# Patient Record
Sex: MALE | Race: WHITE | URBAN - METROPOLITAN AREA
[De-identification: names, ages, dates, MRNs, and addresses within clinical notes are randomized per-mention and may not be internally consistent; named-entity substitution may affect disease eponyms.]

---

## 2017-07-22 ENCOUNTER — OFFICE VISIT (OUTPATIENT)
Dept: URGENT CARE | Facility: URGENT CARE | Age: 22
End: 2017-07-22
Payer: COMMERCIAL

## 2017-07-22 VITALS
WEIGHT: 222.2 LBS | HEART RATE: 72 BPM | DIASTOLIC BLOOD PRESSURE: 52 MMHG | TEMPERATURE: 97 F | SYSTOLIC BLOOD PRESSURE: 126 MMHG | OXYGEN SATURATION: 97 %

## 2017-07-22 DIAGNOSIS — H60.501 ACUTE OTITIS EXTERNA OF RIGHT EAR, UNSPECIFIED TYPE: Primary | ICD-10-CM

## 2017-07-22 PROCEDURE — 99202 OFFICE O/P NEW SF 15 MIN: CPT | Performed by: PHYSICIAN ASSISTANT

## 2017-07-22 RX ORDER — CIPROFLOXACIN AND DEXAMETHASONE 3; 1 MG/ML; MG/ML
4 SUSPENSION/ DROPS AURICULAR (OTIC) 2 TIMES DAILY
Qty: 7.5 ML | Refills: 0 | Status: SHIPPED | OUTPATIENT
Start: 2017-07-22 | End: 2017-07-29

## 2017-07-22 RX ORDER — HYDROCORTISONE AND ACETIC ACID 20.75; 10.375 MG/ML; MG/ML
SOLUTION AURICULAR (OTIC) 2 TIMES DAILY
COMMUNITY

## 2017-07-22 RX ORDER — AMOXICILLIN 875 MG
875 TABLET ORAL 2 TIMES DAILY
COMMUNITY

## 2017-07-22 NOTE — NURSING NOTE
Chief Complaint   Patient presents with     Urgent Care     Ear Problem     Pt states has x 3 days in right ear. Pt states does have some drainage coming from ear.        Initial /52 (BP Location: Right arm, Patient Position: Chair, Cuff Size: Adult Large)  Pulse 72  Temp 97  F (36.1  C) (Tympanic)  Wt 222 lb 3.2 oz (100.8 kg)  SpO2 97% There is no height or weight on file to calculate BMI.  Medication Reconciliation: unable or not appropriate to perform   Josue Wyatt CMA ([facility]) 7/22/2017 10:39 AM

## 2017-07-22 NOTE — PATIENT INSTRUCTIONS
External Ear Infection (Adult)    External otitis (also called  swimmer s ear ) is an infection in the ear canal. It is often caused by bacteria or fungus. It can occur a few days after water gets trapped in the ear canal (from swimming or bathing). It can also occur after cleaning too deeply in the ear canal with a cotton swab or other object. Sometimes, hair care products get into the ear canal and cause this problem.  Symptoms can include pain, fever, itching, redness, drainage, or swelling of the ear canal. Temporary hearing loss may also occur.  Home care    Do not try to clean the ear canal. This can push pus and bacteria deeper into the canal.    Use prescribed ear drops as directed. These help reduce swelling and fight the infection. If an ear wick was placed in the ear canal, apply drops right onto the end of the wick. The wick will draw the medication into the ear canal even if it is swollen closed.    A cotton ball may be loosely placed in the outer ear to absorb any drainage.    You may use acetaminophen or ibuprofen to control pain, unless another medication was prescribed. Note: If you have chronic liver or kidney disease or ever had a stomach ulcer or GI bleeding, talk to your health care provider before taking any of these medications.    Do not allow water to get into your ear when bathing. Also, avoid swimming until the infection has cleared.  Prevention    Keep your ears dry. This helps lower the risk of infection. Dry your ears with a towel or hair dryer after getting wet. Also, use ear plugs when swimming.    Do not stick any objects in the ear to remove wax.    If you feel water trapped in your ear, use ear drops right away. You can get these drops over the counter at most drugstores. They work by removing water from the ear canal.  Follow-up care  Follow up with your health care provider in one week, or as advised.  When to seek medical advice  Call your health care provider right away if  any of these occur:    Ear pain becomes worse or doesn t improve after 3 days of treatment    Redness or swelling of the outer ear occurs or gets worse    Headache    Painful or stiff neck    Drowsiness or confusion    Fever of 100.4 F (38 C) or higher, or as directed by your health care provider    Seizure  Date Last Reviewed: 3/22/2015    5527-8140 [employer]. [address] [place] [address]. All rights reserved. This information is not intended as a substitute for professional medical care. Always follow your healthcare professional's instructions.

## 2017-07-22 NOTE — MR AVS SNAPSHOT
After Visit Summary   7/22/2017    Negro Varela    MRN: [id number]           Patient Information     Date Of Birth          1995        Visit Information        Provider Department      7/22/2017 10:25 AM Orlando Alex PA-C [facility]        Today's Diagnoses     Acute otitis externa of right ear, unspecified type    -  1      Care Instructions      External Ear Infection (Adult)    External otitis (also called  swimmer s ear ) is an infection in the ear canal. It is often caused by bacteria or fungus. It can occur a few days after water gets trapped in the ear canal (from swimming or bathing). It can also occur after cleaning too deeply in the ear canal with a cotton swab or other object. Sometimes, hair care products get into the ear canal and cause this problem.  Symptoms can include pain, fever, itching, redness, drainage, or swelling of the ear canal. Temporary hearing loss may also occur.  Home care    Do not try to clean the ear canal. This can push pus and bacteria deeper into the canal.    Use prescribed ear drops as directed. These help reduce swelling and fight the infection. If an ear wick was placed in the ear canal, apply drops right onto the end of the wick. The wick will draw the medication into the ear canal even if it is swollen closed.    A cotton ball may be loosely placed in the outer ear to absorb any drainage.    You may use acetaminophen or ibuprofen to control pain, unless another medication was prescribed. Note: If you have chronic liver or kidney disease or ever had a stomach ulcer or GI bleeding, talk to your health care provider before taking any of these medications.    Do not allow water to get into your ear when bathing. Also, avoid swimming until the infection has cleared.  Prevention    Keep your ears dry. This helps lower the risk of infection. Dry your ears with a towel or hair dryer after getting wet. Also, use ear plugs  when swimming.    Do not stick any objects in the ear to remove wax.    If you feel water trapped in your ear, use ear drops right away. You can get these drops over the counter at most drugstores. They work by removing water from the ear canal.  Follow-up care  Follow up with your health care provider in one week, or as advised.  When to seek medical advice  Call your health care provider right away if any of these occur:    Ear pain becomes worse or doesn t improve after 3 days of treatment    Redness or swelling of the outer ear occurs or gets worse    Headache    Painful or stiff neck    Drowsiness or confusion    Fever of 100.4 F (38 C) or higher, or as directed by your health care provider    Seizure  Date Last Reviewed: 3/22/2015    3173-4612 [employer]. [address] [place] [address]. All rights reserved. This information is not intended as a substitute for professional medical care. Always follow your healthcare professional's instructions.                Follow-ups after your visit        Who to contact     If you have questions or need follow up information about today's clinic visit or your schedule please contact [facility] directly at [phone].  Normal or non-critical lab and imaging results will be communicated to you by Intuithart, letter or phone within 4 business days after the clinic has received the results. If you do not hear from us within 7 days, please contact the clinic through Intuithart or phone. If you have a critical or abnormal lab result, we will notify you by phone as soon as possible.  Submit refill requests through [employer]ault or call your pharmacy and they will forward the refill request to us. Please allow 3 business days for your refill to be completed.          Additional Information About Your Visit        Intuithart Information     [employer] lets you send messages to your doctor, view your test results, renew your prescriptions, schedule  "appointments and more. To sign up, go to [email or web address] . Click on \"Log in\" on the left side of the screen, which will take you to the Welcome page. Then click on \"Sign up Now\" on the right side of the page.     You will be asked to enter the access code listed below, as well as some personal information. Please follow the directions to create your username and password.     Your access code is: [email or web address]  Expires: 10/20/2017 10:57 AM     Your access code will  in 90 days. If you need help or a new code, please call your [facility] clinic or [phone].        Care EveryWhere ID     This is your Care EveryWhere ID. This could be used by other organizations to access your [facility] medical records  [id number]        Your Vitals Were     Pulse Temperature Pulse Oximetry             72 97  F (36.1  C) (Tympanic) 97%          Blood Pressure from Last 3 Encounters:   17 126/52    Weight from Last 3 Encounters:   17 222 lb 3.2 oz (100.8 kg)              Today, you had the following     No orders found for display         Today's Medication Changes          These changes are accurate as of: 17 10:57 AM.  If you have any questions, ask your nurse or doctor.               Start taking these medicines.        Dose/Directions    ciprofloxacin-dexamethasone otic suspension   Commonly known as:  CIPRODEX   Used for:  Acute otitis externa of right ear, unspecified type   Started by:  Orlando Alex PA-C        Dose:  4 drop   Place 4 drops into the right ear 2 times daily for 7 days   Quantity:  7.5 mL   Refills:  0            Where to get your medicines      These medications were sent to [facility] - [address]  [address], [place] [address]     Phone:  [phone]     ciprofloxacin-dexamethasone otic suspension                Primary Care Provider    Fvl None       No address on file        Equal Access " to Services     LAURA GROSSMAN : Rebecca Nicole, wawalter dueñas, qakathijames barahonaabadtian williamson. So [facility] [phone].    ATENCIÓN: Si habla español, tiene a sheridan disposición servicios gratuitos de asistencia lingüística. Llame al [phone].    We comply with applicable federal civil rights laws and [place] laws. We do not discriminate on the basis of race, color, national origin, age, disability sex, sexual orientation or gender identity.            Thank you!     Thank you for choosing [facility]  for your care. Our goal is always to provide you with excellent care. Hearing back from our patients is one way we can continue to improve our services. Please take a few minutes to complete the written survey that you may receive in the mail after your visit with us. Thank you!             Your Updated Medication List - Protect others around you: Learn how to safely use, store and throw away your medicines at www.disposemymeds.org.          This list is accurate as of: 7/22/17 10:57 AM.  Always use your most recent med list.                   Brand Name Dispense Instructions for use Diagnosis    acetic acid-hydrocortisone otic solution    VOSOL-HC     2 times daily        amoxicillin 875 MG tablet    AMOXIL     Take 875 mg by mouth 2 times daily        ciprofloxacin-dexamethasone otic suspension    CIPRODEX    7.5 mL    Place 4 drops into the right ear 2 times daily for 7 days    Acute otitis externa of right ear, unspecified type

## 2017-07-22 NOTE — PROGRESS NOTES
"SUBJECTIVE:    Negro Varela presents to  today for evaluation of right ear pain. He was reportedly dx with both right sided OM and right sided \"swimmer's ear\" 3 days ago ane was placed on Amox 875 bid and Vosol-HC. Pain is increasing. MA note states drainage from right ear.  I personally questioned patient and he denies any drainage.     ROS:     HEENT: Denies any nasal congestion or ST. NO left ear pain. Denies any facial pain or swelling.   RESP:Denies any cough, wheezing or SOB.   GI: Denies any N/V/D. No abdominal pain. Normal BM's  SKIN: Denies rash  NEURO: Denies any headache. headaches, neck stiffness, mental status changes or lethargy.       No past medical history on file.    Current Outpatient Prescriptions:      amoxicillin (AMOXIL) 875 MG tablet, Take 875 mg by mouth 2 times daily, Disp: , Rfl:      acetic acid-hydrocortisone (VOSOL-HC) otic solution, 2 times daily, Disp: , Rfl:   No Known Allergies        OBJECTIVE:  /52 (BP Location: Right arm, Patient Position: Chair, Cuff Size: Adult Large)  Pulse 72  Temp 97  F (36.1  C) (Tympanic)  Wt 222 lb 3.2 oz (100.8 kg)  SpO2 97%    General appearance: alert and no apparent distress  Skin color is pink and without rash.  HEENT:   Conjunctiva not injected.  Sclera clear.  Left TM is normal: no effusions, no erythema, and normal landmarks.  Right external canal is erythematous. Mildly edematous and has small amount of purulent debris present. Positive mild tenderness on manipulation of tragus. No christina-auricular swelling, redness or pain. No facial swelling, redness or pain. The portion of TM I am able to visualize today (approximately 75%) is normal in color and unremarkable.   Nasal mucosa is normal.  Oropharyngeal exam is normal: no lesions, erythema, adenopathy or exudate.  Neck is supple, FROM with no adenopathy  CARDIAC:NORMAL - regular rate and rhythm without murmur.  RESP: Normal - CTA without rales, rhonchi, or wheezing.  ABDOMEN: Abdomen " "soft, non-tender. BS normal. No masses, organomegaly    ASSESSMENT/PLAN:    (E17.450) Acute otitis externa of right ear, unspecified type  (primary encounter diagnosis)  Comment: Prior dx of ROM and SALAS. OM looks resolved. OE not responding to prior tx.   Plan: ciprofloxacin-dexamethasone (CIPRODEX) otic         suspension    1. Continue Amox/advised to finish previously prescribed course of abx   2. Stop Vosol-HC  3. Start Ciprodex   4. Follow-up with PCP if sxs change, worsen or fail to fully resolve with above tx.  5.  In addition to the above, OE \"red flag\" signs and sxs are reviewed with pt both verbally and by way of printed educational material for home review.  Pt verbalizes understanding of and agrees to the above plan.           "

## 2017-07-24 ENCOUNTER — HOSPITAL ENCOUNTER (EMERGENCY)
Facility: CLINIC | Age: 22
Discharge: HOME | End: 2017-07-24
Attending: EMERGENCY MEDICINE | Admitting: EMERGENCY MEDICINE
Payer: COMMERCIAL

## 2017-07-24 VITALS
RESPIRATION RATE: 18 BRPM | TEMPERATURE: 98.3 F | OXYGEN SATURATION: 99 % | DIASTOLIC BLOOD PRESSURE: 94 MMHG | SYSTOLIC BLOOD PRESSURE: 138 MMHG | HEART RATE: 61 BPM | WEIGHT: 215 LBS

## 2017-07-24 DIAGNOSIS — H60.501 ACUTE OTITIS EXTERNA OF RIGHT EAR, UNSPECIFIED TYPE: ICD-10-CM

## 2017-07-24 PROCEDURE — 69210 REMOVE IMPACTED EAR WAX UNI: CPT | Mod: RT

## 2017-07-24 PROCEDURE — 99283 EMERGENCY DEPT VISIT LOW MDM: CPT

## 2017-07-24 PROCEDURE — 25000132 ZZH RX MED GY IP 250 OP 250 PS 637: Performed by: EMERGENCY MEDICINE

## 2017-07-24 RX ORDER — OXYCODONE AND ACETAMINOPHEN 5; 325 MG/1; MG/1
1 TABLET ORAL ONCE
Status: COMPLETED | OUTPATIENT
Start: 2017-07-24 | End: 2017-07-24

## 2017-07-24 RX ORDER — HYDROCODONE BITARTRATE AND ACETAMINOPHEN 5; 325 MG/1; MG/1
1-2 TABLET ORAL EVERY 4 HOURS PRN
Qty: 8 TABLET | Refills: 0 | Status: SHIPPED | OUTPATIENT
Start: 2017-07-24

## 2017-07-24 RX ADMIN — OXYCODONE HYDROCHLORIDE AND ACETAMINOPHEN 1 TABLET: 5; 325 TABLET ORAL at 02:54

## 2017-07-24 NOTE — ED PROVIDER NOTES
History     Chief Complaint:  Otalgia    HPI   Negro Varela is a 21 year old male who presents to the emergency department today for evaluation of otalgia. The patient was already diagnosed with otitis and is currently on Amoxicillin and ear drops that started about 2 days ago, but continues to complain of right ear pain that originally began about 4 days ago. The patient has also taken ibuprofen for pain control.    Allergies:  No Known Drug Allergies    Medications:    amoxicillin (AMOXIL) 875 MG tablet  acetic acid-hydrocortisone (VOSOL-HC) otic solution  ciprofloxacin-dexamethasone (CIPRODEX) otic suspension    Past Medical History:    History reviewed. No pertinent past medical history.    Past Surgical History:    History reviewed. No pertinent past surgical history.     Family History:    History reviewed. No pertinent family history.      Social History:  The patient was accompanied to the ED by his girlfriend.  Marital Status:  Single [1]     Review of Systems   HENT: Positive for ear pain (right ear).    All other systems reviewed and are negative.    Physical Exam     Patient Vitals for the past 24 hrs:   BP Temp Temp src Pulse Resp SpO2 Weight   07/24/17 0206 (!) 138/94 - - - - - -   07/24/17 0204 - 98.3  F (36.8  C) Temporal 61 18 99 % 97.5 kg (215 lb)     Physical Exam  Vital signs and nursing notes reviewed.     Constitutional: laying on gurney appears mildly uncomfortable  HENT: Oropharynx is clear and moist  Eyes: Conjunctivae are normal bilaterally. Pupils equal  Ears: Left ear appears normal. Right ear canal has edema at inferior aspect with partial occlusion of ear canal. No redness or swelling of external ear or periauricular area.  Neck: normal range of motion  Cardiovascular: Normal rate, regular rhythm, normal heart sounds.   Pulmonary/Chest: Effort normal and breath sounds normal. No respiratory distress.   Musculoskeletal: No joint swelling or edema.   Neurological: Alert and oriented.  No focal weakness  Skin: Skin is warm and dry. No rash noted.   Psych: normal affect    Emergency Department Course     Interventions:  0254 Percocet 1 tablet PO     Emergency Department Course:  Nursing notes and vitals reviewed.  I performed an exam of the patient as documented above.   The patient received the above intervention(s).   I discussed the treatment plan with the patient. They expressed understanding of this plan and consented to discharge. They will be discharged home with instructions for care and follow up. In addition, the patient will return to the emergency department if their symptoms persist, worsen, if new symptoms arise or if there is any concern.  All questions were answered.    Impression & Plan      Medical Decision Making:  Negro Varela is a 21 year old male who presents to the emergency department today for evaluation of worsening right ear canal pain. He had recently been started on Ciprodex yesterday from urgent care and has been on Augmentin. However, his pain is getting worse and he hasn't been able to sleep and has come in for evaluation. I performed some debris removal from the ear canal with an ear curette and then placed an ear wick. He has findings of a moderate otitis externa with approximately 80% of the canal blocked at this time. There is no evidence of TM involvement, there is no periauricular swelling, or ear erythema to suggest expansion of cellulitis. He is on the appropriate antibiotic dosing and the ear wick may help with the penetration and I recommend that he continue those drops. He was given medication for pain and he will follow up with his primary care physician in 48 hours for recheck.     Diagnosis:    ICD-10-CM   1. Acute otitis externa of right ear, unspecified type H60.501     Disposition:   The patient was discharged to home with the below medications.    Discharge Medications:  New Prescriptions    HYDROCODONE-ACETAMINOPHEN (NORCO) 5-325 MG PER TABLET     Take 1-2 tablets by mouth every 4 hours as needed for pain     Scribe Disclosure:  I, Reno Oneill, am serving as a scribe at 2:15 AM on 7/24/2017 to document services personally performed by Jefe Russell MD, based on my observations and the provider's statements to me.  7/24/2017   [facility] EMERGENCY DEPARTMENT       Jefe Russell MD  07/24/17 0700

## 2017-07-24 NOTE — ED AVS SNAPSHOT
[facility]    [address] [place] [address]    Phone:  [phone]    Fax:  [phone]                                       Negro Varela   MRN: [id number]    Department:  [facility]   Date of Visit:  7/24/2017           Patient Information     Date Of Birth          1995        Your diagnoses for this visit were:     Acute otitis externa of right ear, unspecified type        You were seen by Jefe Russell MD.      Follow-up Information     Follow up with None, Ike In 2 days.      Discharge References/Attachments     EXTERNAL EAR INFECTION (ADULT) (ENGLISH)      24 Hour Appointment Hotline       To make an appointment at any [facility] clinic, call [phone] ([phone]). If you don't have a family doctor or clinic, we will help you find one. [facility] clinics are conveniently located to serve the needs of you and your family.             Review of your medicines      START taking        Dose / Directions Last dose taken    HYDROcodone-acetaminophen 5-325 MG per tablet   Commonly known as:  NORCO   Dose:  1-2 tablet   Quantity:  8 tablet        Take 1-2 tablets by mouth every 4 hours as needed for pain   Refills:  0          Our records show that you are taking the medicines listed below. If these are incorrect, please call your family doctor or clinic.        Dose / Directions Last dose taken    acetic acid-hydrocortisone otic solution   Commonly known as:  VOSOL-HC        2 times daily   Refills:  0        amoxicillin 875 MG tablet   Commonly known as:  AMOXIL   Dose:  875 mg        Take 875 mg by mouth 2 times daily   Refills:  0        ciprofloxacin-dexamethasone otic suspension   Commonly known as:  CIPRODEX   Dose:  4 drop   Quantity:  7.5 mL        Place 4 drops into the right ear 2 times daily for 7 days   Refills:  0                Prescriptions were sent or printed at these locations (1  Prescription)                   Other Prescriptions                Printed at Department/Unit printer (1 of 1)         HYDROcodone-acetaminophen (NORCO) 5-325 MG per tablet                Orders Needing Specimen Collection     None      Pending Results     No orders found from 7/22/2017 to 7/25/2017.            Pending Culture Results     No orders found from 7/22/2017 to 7/25/2017.            Pending Results Instructions     If you had any lab results that were not finalized at the time of your Discharge, you can call the ED Lab Result RN at [phone]. You will be contacted by this team for any positive Lab results or changes in treatment. The nurses are available 7 days a week from 10A to 6:30P.  You can leave a message 24 hours per day and they will return your call.        Test Results From Your Hospital Stay               Clinical Quality Measure: Blood Pressure Screening     Your blood pressure was checked while you were in the emergency department today. The last reading we obtained was  BP: (!) 138/94 . Please read the guidelines below about what these numbers mean and what you should do about them.  If your systolic blood pressure (the top number) is less than 120 and your diastolic blood pressure (the bottom number) is less than 80, then your blood pressure is normal. There is nothing more that you need to do about it.  If your systolic blood pressure (the top number) is 120-139 or your diastolic blood pressure (the bottom number) is 80-89, your blood pressure may be higher than it should be. You should have your blood pressure rechecked within a year by a primary care provider.  If your systolic blood pressure (the top number) is 140 or greater or your diastolic blood pressure (the bottom number) is 90 or greater, you may have high blood pressure. High blood pressure is treatable, but if left untreated over time it can put you at risk for heart attack, stroke, or kidney failure. You should have your  "blood pressure rechecked by a primary care provider within the next 4 weeks.  If your provider in the emergency department today gave you specific instructions to follow-up with your doctor or provider even sooner than that, you should follow that instruction and not wait for up to 4 weeks for your follow-up visit.        Thank you for choosing [facility]       Thank you for choosing [facility] for your care. Our goal is always to provide you with excellent care. Hearing back from our patients is one way we can continue to improve our services. Please take a few minutes to complete the written survey that you may receive in the mail after you visit with us. Thank you!        [employer] Information     [employer] lets you send messages to your doctor, view your test results, renew your prescriptions, schedule appointments and more. To sign up, go to [email or web address] . Click on \"Log in\" on the left side of the screen, which will take you to the Welcome page. Then click on \"Sign up Now\" on the right side of the page.     You will be asked to enter the access code listed below, as well as some personal information. Please follow the directions to create your username and password.     Your access code is: [email or web address]  Expires: 10/20/2017 10:57 AM     Your access code will  in 90 days. If you need help or a new code, please call your [facility] clinic or [phone].        Care EveryWhere ID     This is your Care EveryWhere ID. This could be used by other organizations to access your [facility] medical records  [id number]        Equal Access to Services     LAURA GROSSMAN : Hadii aad ku hadasho Soomaali, waaxda luqadaha, qaybta kaalmada adeegyada, tian garza . So [facility] [phone].    ATENCIÓN: Si habla español, tiene a sheridan disposición servicios gratuitos de asistencia lingüística. Llame al [phone].    We comply with applicable federal civil rights laws and [place] laws. We do not " discriminate on the basis of race, color, national origin, age, disability sex, sexual orientation or gender identity.            After Visit Summary       This is your record. Keep this with you and show to your community pharmacist(s) and doctor(s) at your next visit.

## 2017-07-24 NOTE — ED NOTES
Pt c/o right ear pain.  Already diagnosed with otitis and is currently on Amoxicillin and ear drops.

## 2017-07-24 NOTE — ED AVS SNAPSHOT
[facility] Emergency Department    [address]    [facility] [address]    Phone:  [phone]    Fax:  [phone]                                       Negro Varela   MRN: [id number]    Department:  [facility] Emergency Department   Date of Visit:  7/24/2017           After Visit Summary Signature Page     I have received my discharge instructions, and my questions have been answered. I have discussed any challenges I see with this plan with the nurse or doctor.    ..........................................................................................................................................  Patient/Patient Representative Signature      ..........................................................................................................................................  Patient Representative Print Name and Relationship to Patient    ..................................................               ................................................  Date                                            Time    ..........................................................................................................................................  Reviewed by Signature/Title    ...................................................              ..............................................  Date                                                            Time

## 2023-06-09 ENCOUNTER — OFFICE VISIT (OUTPATIENT)
Dept: PRIMARY CARE | Facility: CLINIC | Age: 28
End: 2023-06-09
Payer: COMMERCIAL

## 2023-06-09 DIAGNOSIS — H10.9 CONJUNCTIVITIS OF RIGHT EYE, UNSPECIFIED CONJUNCTIVITIS TYPE: Primary | ICD-10-CM

## 2023-06-09 DIAGNOSIS — H10.31 ACUTE BACTERIAL CONJUNCTIVITIS OF RIGHT EYE: Primary | ICD-10-CM

## 2023-06-09 DIAGNOSIS — J30.9 ALLERGIC RHINITIS, UNSPECIFIED SEASONALITY, UNSPECIFIED TRIGGER: ICD-10-CM

## 2023-06-09 PROBLEM — K58.9 IBS (IRRITABLE BOWEL SYNDROME): Status: ACTIVE | Noted: 2023-06-09

## 2023-06-09 PROCEDURE — 99213 OFFICE O/P EST LOW 20 MIN: CPT | Performed by: FAMILY MEDICINE

## 2023-06-09 PROCEDURE — 1036F TOBACCO NON-USER: CPT | Performed by: FAMILY MEDICINE

## 2023-06-09 RX ORDER — TOBRAMYCIN 3 MG/ML
2 SOLUTION/ DROPS OPHTHALMIC EVERY 4 HOURS
Status: DISCONTINUED | OUTPATIENT
Start: 2023-06-09 | End: 2023-06-09

## 2023-06-09 RX ORDER — AMOXICILLIN AND CLAVULANATE POTASSIUM 875; 125 MG/1; MG/1
875 TABLET, FILM COATED ORAL 2 TIMES DAILY
Qty: 20 TABLET | Refills: 0 | Status: SHIPPED | OUTPATIENT
Start: 2023-06-09 | End: 2023-06-19

## 2023-06-09 RX ORDER — TOBRAMYCIN 3 MG/ML
2 SOLUTION/ DROPS OPHTHALMIC EVERY 4 HOURS
Qty: 3 ML | Refills: 0 | Status: SHIPPED | OUTPATIENT
Start: 2023-06-09 | End: 2023-06-10 | Stop reason: ALTCHOICE

## 2023-06-10 RX ORDER — ERYTHROMYCIN 5 MG/G
OINTMENT OPHTHALMIC 4 TIMES DAILY
Qty: 3.5 G | Refills: 0 | Status: SHIPPED | OUTPATIENT
Start: 2023-06-10 | End: 2023-06-17

## 2023-06-10 ASSESSMENT — ENCOUNTER SYMPTOMS
FATIGUE: 0
PHOTOPHOBIA: 0
ACTIVITY CHANGE: 0
FEVER: 0
CHILLS: 0

## 2023-06-10 NOTE — PATIENT INSTRUCTIONS
Expect improvement in the next 48 hours.  If any fever chills or headache or visual disturbance noted.  Please call day or night.

## 2023-06-10 NOTE — PROGRESS NOTES
Subjective   Patient ID: Gonzalo Devi is a 27 y.o. male who presents for No chief complaint on file..    HPI patient having discomfort swelling in the right upper eyelid.  Has had a little bit of drainage in the eye during the daytime.  This has been tender and he has been feeling not well with it.  Is been no fever no chills no double vision no blurring vision but trouble seeing up in the visual field because of the swelling of the upper lid.    Patient had not a lot of nasal drainage congestion.  No headache no sore throat no difficulty with swallowing.    No troubles with abdominal pain or discomfort.  No troubles with swelling of the legs or feet.    Review of Systems   Constitutional:  Negative for activity change, chills, fatigue and fever.   Eyes:  Positive for visual disturbance. Negative for photophobia.        Swelling of the right upper eyelid.       Objective   There were no vitals taken for this visit.  BSA There is no height or weight on file to calculate BSA.      Physical Exam  Constitutional:       General: He is not in acute distress.     Appearance: He is not toxic-appearing.   HENT:      Head: Normocephalic.      Right Ear: Tympanic membrane normal.   Eyes:      Comments: Swelling of the right upper eyelid.  Redness tenderness noted no herpetic lesions    Slight conjunctival injection noted   Cardiovascular:      Rate and Rhythm: Normal rate and regular rhythm.      Pulses: Normal pulses.      Heart sounds: Normal heart sounds.   Pulmonary:      Effort: Pulmonary effort is normal.   Neurological:      Mental Status: He is alert.       [facility]acy Encounter on 10/12/2022   Component Date Value Ref Range Status    Cholesterol 10/12/2022 189  0 - 199 mg/dL Final    Comment: .      AGE      DESIRABLE   BORDERLINE HIGH   HIGH     0-19 Y     0 - 169       170 - 199     >/= 200    20-24 Y     0 - 189       190 - 224     >/= 225         >24 Y     0 - 199       200 - 239     >/= 240   **All ranges are based  on fasting samples. Specific   therapeutic targets will vary based on patient-specific   cardiac risk.  .   Pediatric guidelines reference:Pediatrics 2011, 128(S5).   Adult guidelines reference: NCEP ATPIII Guidelines,     MARY 2001, 258:2486-97  .   Venipuncture immediately after or during the    administration of Metamizole may lead to falsely   low results. Testing should be performed immediately   prior to Metamizole dosing.      HDL 10/12/2022 46.2  mg/dL Final    Comment: .      AGE      VERY LOW   LOW     NORMAL    HIGH       0-19 Y       < 35   < 40     40-45     ----    20-24 Y       ----   < 40       >45     ----      >24 Y       ----   < 40     40-60      >60  .      Cholesterol/HDL Ratio 10/12/2022 4.1   Final    Comment: REF VALUES  DESIRABLE  < 3.4  HIGH RISK  > 5.0      LDL 10/12/2022 97  0 - 99 mg/dL Final    Comment: .                           NEAR      BORD      AGE      DESIRABLE  OPTIMAL    HIGH     HIGH     VERY HIGH     0-19 Y     0 - 109     ---    110-129   >/= 130     ----    20-24 Y     0 - 119     ---    120-159   >/= 160     ----      >24 Y     0 -  99   100-129  130-159   160-189     >/=190  .      VLDL 10/12/2022 45 (H)  0 - 40 mg/dL Final    Triglycerides 10/12/2022 227 (H)  0 - 149 mg/dL Final    Comment: .      AGE      DESIRABLE   BORDERLINE HIGH   HIGH     VERY HIGH   0 D-90 D    19 - 174         ----         ----        ----  91 D- 9 Y     0 -  74        75 -  99     >/= 100      ----    10-19 Y     0 -  89        90 - 129     >/= 130      ----    20-24 Y     0 - 114       115 - 149     >/= 150      ----         >24 Y     0 - 149       150 - 199    200- 499    >/= 500  .   Venipuncture immediately after or during the    administration of Metamizole may lead to falsely   low results. Testing should be performed immediately   prior to Metamizole dosing.      Non HDL Cholesterol 10/12/2022 143  mg/dL Final    Comment:     AGE      DESIRABLE   BORDERLINE HIGH   HIGH     VERY HIGH      0-19 Y     0 - 119       120 - 144     >/= 145    >/= 160    20-24 Y     0 - 149       150 - 189     >/= 190      ----         >24 Y    30 MG/DL ABOVE LDL CHOLESTEROL GOAL  .      WBC 10/12/2022 7.9  4.4 - 11.3 x10E9/L Final    nRBC 10/12/2022 0.0  0.0 - 0.0 /100 WBC Final    RBC 10/12/2022 5.02  4.50 - 5.90 x10E12/L Final    Hemoglobin 10/12/2022 15.0  13.5 - 17.5 g/dL Final    Hematocrit 10/12/2022 45.4  41.0 - 52.0 % Final    MCV 10/12/2022 90  80 - 100 fL Final    MCHC 10/12/2022 33.0  32.0 - 36.0 g/dL Final    Platelets 10/12/2022 188  150 - 450 x10E9/L Final    RDW 10/12/2022 12.5  11.5 - 14.5 % Final    Neutrophils % 10/12/2022 53.3  40.0 - 80.0 % Final    Immature Granulocytes %, Automated 10/12/2022 0.1  0.0 - 0.9 % Final    Comment:  Immature Granulocyte Count (IG) includes promyelocytes,    myelocytes and metamyelocytes but does not include bands.   Percent differential counts (%) should be interpreted in the   context of the absolute cell counts (cells/L).      Lymphocytes % 10/12/2022 34.1  13.0 - 44.0 % Final    Monocytes % 10/12/2022 8.1  2.0 - 10.0 % Final    Eosinophils % 10/12/2022 3.0  0.0 - 6.0 % Final    Basophils % 10/12/2022 1.4  0.0 - 2.0 % Final    Neutrophils Absolute 10/12/2022 4.19  1.20 - 7.70 x10E9/L Final    Lymphocytes Absolute 10/12/2022 2.68  1.20 - 4.80 x10E9/L Final    Monocytes Absolute 10/12/2022 0.64  0.10 - 1.00 x10E9/L Final    Eosinophils Absolute 10/12/2022 0.24  0.00 - 0.70 x10E9/L Final    Basophils Absolute 10/12/2022 0.11 (H)  0.00 - 0.10 x10E9/L Final    Glucose 10/12/2022 85  74 - 99 mg/dL Final    Sodium 10/12/2022 140  136 - 145 mmol/L Final    Potassium 10/12/2022 4.0  3.5 - 5.3 mmol/L Final    Chloride 10/12/2022 106  98 - 107 mmol/L Final    Bicarbonate 10/12/2022 25  21 - 32 mmol/L Final    Anion Gap 10/12/2022 13  10 - 20 mmol/L Final    Urea Nitrogen 10/12/2022 17  6 - 23 mg/dL Final    Creatinine 10/12/2022 1.20  0.50 - 1.30 mg/dL Final    GFR MALE 10/12/2022  85  >90 mL/min/1.73m2 Final    Comment:  CALCULATIONS OF ESTIMATED GFR ARE PERFORMED   USING THE 2021 CKD-EPI STUDY REFIT EQUATION   WITHOUT THE RACE VARIABLE FOR THE IDMS-TRACEABLE   CREATININE METHODS.    https://jasn.asnjournals.org/content/early/2021/09/22/ASN.2297555417      Calcium 10/12/2022 9.6  8.6 - 10.6 mg/dL Final    Albumin 10/12/2022 4.3  3.4 - 5.0 g/dL Final    Alkaline Phosphatase 10/12/2022 53  33 - 120 U/L Final    Total Protein 10/12/2022 6.8  6.4 - 8.2 g/dL Final    AST 10/12/2022 24  9 - 39 U/L Final    Total Bilirubin 10/12/2022 0.7  0.0 - 1.2 mg/dL Final    ALT (SGPT) 10/12/2022 28  10 - 52 U/L Final    Comment:  Patients treated with Sulfasalazine may generate    falsely decreased results for ALT.      TSH 10/12/2022 1.35  0.44 - 3.98 mIU/L Final    Comment:  TSH testing is performed using different testing    methodology at [facility] than at other    [facility] hospitals. Direct result comparisons should    only be made within the same method.     Legacy Encounter on 08/01/2022   Component Date Value Ref Range Status    WBC 08/01/2022 6.7  4.4 - 11.3 x10E9/L Final    nRBC 08/01/2022 0.0  0.0 - 0.0 /100 WBC Final    RBC 08/01/2022 4.94  4.50 - 5.90 x10E12/L Final    Hemoglobin 08/01/2022 15.0  13.5 - 17.5 g/dL Final    Hematocrit 08/01/2022 44.1  41.0 - 52.0 % Final    MCV 08/01/2022 89  80 - 100 fL Final    MCHC 08/01/2022 34.0  32.0 - 36.0 g/dL Final    Platelets 08/01/2022 171  150 - 450 x10E9/L Final    RDW 08/01/2022 12.2  11.5 - 14.5 % Final    Neutrophils % 08/01/2022 55.2  40.0 - 80.0 % Final    Immature Granulocytes %, Automated 08/01/2022 0.1  0.0 - 0.9 % Final    Comment:  Immature Granulocyte Count (IG) includes promyelocytes,    myelocytes and metamyelocytes but does not include bands.   Percent differential counts (%) should be interpreted in the   context of the absolute cell counts (cells/L).      Lymphocytes % 08/01/2022 33.9  13.0 - 44.0 % Final    Monocytes %  08/01/2022 8.1  2.0 - 10.0 % Final    Eosinophils % 08/01/2022 1.5  0.0 - 6.0 % Final    Basophils % 08/01/2022 1.2  0.0 - 2.0 % Final    Neutrophils Absolute 08/01/2022 3.69  1.20 - 7.70 x10E9/L Final    Lymphocytes Absolute 08/01/2022 2.27  1.20 - 4.80 x10E9/L Final    Monocytes Absolute 08/01/2022 0.54  0.10 - 1.00 x10E9/L Final    Eosinophils Absolute 08/01/2022 0.10  0.00 - 0.70 x10E9/L Final    Basophils Absolute 08/01/2022 0.08  0.00 - 0.10 x10E9/L Final    TSH 08/01/2022 1.84  0.44 - 3.98 mIU/L Final    Comment:  TSH testing is performed using different testing    methodology at [facility] than at other    [facility]. Direct result comparisons should    only be made within the same method.      Glucose 08/01/2022 100 (H)  74 - 99 mg/dL Final    Sodium 08/01/2022 139  136 - 145 mmol/L Final    Potassium 08/01/2022 3.8  3.5 - 5.3 mmol/L Final    Chloride 08/01/2022 103  98 - 107 mmol/L Final    Bicarbonate 08/01/2022 25  21 - 32 mmol/L Final    Anion Gap 08/01/2022 15  10 - 20 mmol/L Final    Urea Nitrogen 08/01/2022 16  6 - 23 mg/dL Final    Creatinine 08/01/2022 1.05  0.50 - 1.30 mg/dL Final    GFR MALE 08/01/2022 >90  >90 mL/min/1.73m2 Final    Comment:  CALCULATIONS OF ESTIMATED GFR ARE PERFORMED   USING THE 2021 CKD-EPI STUDY REFIT EQUATION   WITHOUT THE RACE VARIABLE FOR THE IDMS-TRACEABLE   CREATININE METHODS.    https://jasn.asnjournals.org/content/early/2021/09/22/ASN.6273224561      Calcium 08/01/2022 9.6  8.6 - 10.6 mg/dL Final    Albumin 08/01/2022 4.8  3.4 - 5.0 g/dL Final    Alkaline Phosphatase 08/01/2022 58  33 - 120 U/L Final    Total Protein 08/01/2022 7.2  6.4 - 8.2 g/dL Final    AST 08/01/2022 30  9 - 39 U/L Final    Total Bilirubin 08/01/2022 0.9  0.0 - 1.2 mg/dL Final    ALT (SGPT) 08/01/2022 52  10 - 52 U/L Final    Comment:  Patients treated with Sulfasalazine may generate    falsely decreased results for ALT.       Current Outpatient Medications on File Prior to  Visit   Medication Sig Dispense Refill    amoxicillin-pot clavulanate (Augmentin) 875-125 mg tablet Take 1 tablet (875 mg) by mouth 2 times a day for 10 days. 20 tablet 0    [DISCONTINUED] tobramycin (Tobrex) 0.3 % ophthalmic solution Administer 2 drops into the right eye every 4 hours for 5 days. 3 mL 0    [DISCONTINUED] tobramycin-lotepred 0.3-0.5 % drops,suspension Administer into affected eye(s).       Current Facility-Administered Medications on File Prior to Visit   Medication Dose Route Frequency Provider Last Rate Last Admin    [DISCONTINUED] tobramycin (Tobrex) 0.3 % ophthalmic solution 2 drop  2 drop Both Eyes q4h Gonzalo Devi,          No images are attached to the encounter.            Assessment/Plan   Problem List Items Addressed This Visit          Other    Allergic rhinitis     Other Visit Diagnoses       Acute bacterial conjunctivitis of right eye    -  Primary    Relevant Medications    erythromycin (Romycin) 5 mg/gram (0.5 %) ophthalmic ointment

## 2023-11-16 DIAGNOSIS — Z00.00 HEALTH MAINTENANCE EXAMINATION: ICD-10-CM

## 2023-11-17 ENCOUNTER — LAB (OUTPATIENT)
Dept: LAB | Facility: LAB | Age: 28
End: 2023-11-17
Payer: COMMERCIAL

## 2023-11-17 DIAGNOSIS — Z00.00 HEALTH MAINTENANCE EXAMINATION: ICD-10-CM

## 2023-11-17 PROCEDURE — 36415 COLL VENOUS BLD VENIPUNCTURE: CPT

## 2023-11-17 PROCEDURE — 80061 LIPID PANEL: CPT

## 2023-11-17 PROCEDURE — 80053 COMPREHEN METABOLIC PANEL: CPT

## 2023-11-17 PROCEDURE — 85025 COMPLETE CBC W/AUTO DIFF WBC: CPT

## 2023-11-17 ASSESSMENT — ENCOUNTER SYMPTOMS
ARTHRALGIAS: 0
CONSTIPATION: 0
FACIAL SWELLING: 0
CONFUSION: 0
COLOR CHANGE: 0
ACTIVITY CHANGE: 0
APPETITE CHANGE: 0
CHILLS: 0
COUGH: 0
FEVER: 0
DIARRHEA: 0
PALPITATIONS: 0
WHEEZING: 0
EYE DISCHARGE: 0

## 2023-11-17 NOTE — PROGRESS NOTES
"Subjective   Patient ID: Gonzalo Devi is a 27 y.o. male who presents for Annual Exam.    HPI   Patient presents for physical exam.     Fam Hx  Mom () living,   Dad () living,      Exercise walks  ETOH denies  Caffeine denies  Tobacco denies     Colonoscopy @ 45     Patient denies other complaints.   Review of Systems   Constitutional:  Negative for activity change, appetite change, chills and fever.   HENT:  Negative for congestion, ear pain and facial swelling.    Eyes:  Negative for discharge.   Respiratory:  Negative for cough and wheezing.    Cardiovascular:  Negative for chest pain, palpitations and leg swelling.   Gastrointestinal:  Negative for constipation and diarrhea.   Musculoskeletal:  Negative for arthralgias.   Skin:  Negative for color change and pallor.   Neurological:  Negative for syncope.   Psychiatric/Behavioral:  Negative for confusion.        Objective   /78 (BP Location: Left arm)   Pulse 80   Ht 1.88 m (6' 2\")   Wt 119 kg (261 lb 12.8 oz)   BMI 33.61 kg/m²   BSA Body surface area is 2.49 meters squared.      Physical Exam  Constitutional:       General: He is not in acute distress.     Appearance: Normal appearance. He is not toxic-appearing.   HENT:      Head: Normocephalic.      Right Ear: Tympanic membrane, ear canal and external ear normal.      Left Ear: Tympanic membrane, ear canal and external ear normal.   Eyes:      Conjunctiva/sclera: Conjunctivae normal.      Pupils: Pupils are equal, round, and reactive to light.   Cardiovascular:      Rate and Rhythm: Normal rate and regular rhythm.      Pulses: Normal pulses.      Heart sounds: Normal heart sounds.   Pulmonary:      Effort: No respiratory distress.      Breath sounds: No wheezing, rhonchi or rales.   Abdominal:      General: Bowel sounds are normal. There is no distension.      Palpations: Abdomen is soft.      Tenderness: There is no abdominal tenderness.   Musculoskeletal:         General: No swelling or " tenderness.   Skin:     Findings: No lesion or rash.   Neurological:      General: No focal deficit present.      Mental Status: He is alert and oriented to person, place, and time. Mental status is at baseline.      Gait: Gait normal.   Psychiatric:         Mood and Affect: Mood normal.         Behavior: Behavior normal.         Thought Content: Thought content normal.         Judgment: Judgment normal.       Lab on 11/17/2023   Component Date Value Ref Range Status    Glucose 11/17/2023 78  74 - 99 mg/dL Final    Sodium 11/17/2023 137  136 - 145 mmol/L Final    Potassium 11/17/2023 4.1  3.5 - 5.3 mmol/L Final    Chloride 11/17/2023 100  98 - 107 mmol/L Final    Bicarbonate 11/17/2023 26  21 - 32 mmol/L Final    Anion Gap 11/17/2023 15  10 - 20 mmol/L Final    Urea Nitrogen 11/17/2023 15  6 - 23 mg/dL Final    Creatinine 11/17/2023 0.98  0.50 - 1.30 mg/dL Final    eGFR 11/17/2023 >90  >60 mL/min/1.73m*2 Final    Calculations of estimated GFR are performed using the 2021 CKD-EPI Study Refit equation without the race variable for the IDMS-Traceable creatinine methods.  https://jasn.asnjournals.org/content/early/2021/09/22/ASN.4692635267    Calcium 11/17/2023 9.9  8.6 - 10.6 mg/dL Final    Albumin 11/17/2023 4.7  3.4 - 5.0 g/dL Final    Alkaline Phosphatase 11/17/2023 43  33 - 120 U/L Final    Total Protein 11/17/2023 7.0  6.4 - 8.2 g/dL Final    AST 11/17/2023 27  9 - 39 U/L Final    Bilirubin, Total 11/17/2023 0.7  0.0 - 1.2 mg/dL Final    ALT 11/17/2023 47  10 - 52 U/L Final    Patients treated with Sulfasalazine may generate falsely decreased results for ALT.    Cholesterol 11/17/2023 254 (H)  0 - 199 mg/dL Final          Age      Desirable   Borderline High   High     0-19 Y     0 - 169       170 - 199     >/= 200    20-24 Y     0 - 189       190 - 224     >/= 225         >24 Y     0 - 199       200 - 239     >/= 240   **All ranges are based on fasting samples. Specific   therapeutic targets will vary based on  patient-specific   cardiac risk.    Pediatric guidelines reference:Pediatrics 2011, 128(S5).Adult guidelines reference: NCEP ATPIII Guidelines,MARY 2001, 258:2486-97    Venipuncture immediately after or during the administration of Metamizole may lead to falsely low results. Testing should be performed immediately prior to Metamizole dosing.    HDL-Cholesterol 11/17/2023 48.6  mg/dL Final      Age       Very Low   Low     Normal    High    0-19 Y    < 35      < 40     40-45     ----  20-24 Y    ----     < 40      >45      ----        >24 Y      ----     < 40     40-60      >60      Cholesterol/HDL Ratio 11/17/2023 5.2   Final      Ref Values  Desirable  < 3.4  High Risk  > 5.0    LDL Calculated 11/17/2023 172 (H)  <=99 mg/dL Final                                Near   Borderline      AGE      Desirable  Optimal    High     High     Very High     0-19 Y     0 - 109     ---    110-129   >/= 130     ----    20-24 Y     0 - 119     ---    120-159   >/= 160     ----      >24 Y     0 -  99   100-129  130-159   160-189     >/=190      VLDL 11/17/2023 33  0 - 40 mg/dL Final    Triglycerides 11/17/2023 165 (H)  0 - 149 mg/dL Final       Age         Desirable   Borderline High   High     Very High   0 D-90 D    19 - 174         ----         ----        ----  91 D- 9 Y     0 -  74        75 -  99     >/= 100      ----    10-19 Y     0 -  89        90 - 129     >/= 130      ----    20-24 Y     0 - 114       115 - 149     >/= 150      ----         >24 Y     0 - 149       150 - 199    200- 499    >/= 500    Venipuncture immediately after or during the administration of Metamizole may lead to falsely low results. Testing should be performed immediately prior to Metamizole dosing.    Non HDL Cholesterol 11/17/2023 205 (H)  0 - 149 mg/dL Final          Age       Desirable   Borderline High   High     Very High     0-19 Y     0 - 119       120 - 144     >/= 145    >/= 160    20-24 Y     0 - 149       150 - 189     >/= 190      ----          >24 Y    30 mg/dL above LDL Cholesterol goal      WBC 11/17/2023 8.4  4.4 - 11.3 x10*3/uL Final    nRBC 11/17/2023 0.0  0.0 - 0.0 /100 WBCs Final    RBC 11/17/2023 5.34  4.50 - 5.90 x10*6/uL Final    Hemoglobin 11/17/2023 15.6  13.5 - 17.5 g/dL Final    Hematocrit 11/17/2023 47.3  41.0 - 52.0 % Final    MCV 11/17/2023 89  80 - 100 fL Final    MCH 11/17/2023 29.2  26.0 - 34.0 pg Final    MCHC 11/17/2023 33.0  32.0 - 36.0 g/dL Final    RDW 11/17/2023 12.0  11.5 - 14.5 % Final    Platelets 11/17/2023 213  150 - 450 x10*3/uL Final    Neutrophils % 11/17/2023 54.7  40.0 - 80.0 % Final    Immature Granulocytes %, Automated 11/17/2023 0.4  0.0 - 0.9 % Final    Immature Granulocyte Count (IG) includes promyelocytes, myelocytes and metamyelocytes but does not include bands. Percent differential counts (%) should be interpreted in the context of the absolute cell counts (cells/UL).    Lymphocytes % 11/17/2023 33.6  13.0 - 44.0 % Final    Monocytes % 11/17/2023 7.3  2.0 - 10.0 % Final    Eosinophils % 11/17/2023 2.4  0.0 - 6.0 % Final    Basophils % 11/17/2023 1.6  0.0 - 2.0 % Final    Neutrophils Absolute 11/17/2023 4.58  1.20 - 7.70 x10*3/uL Final    Percent differential counts (%) should be interpreted in the context of the absolute cell counts (cells/uL).    Immature Granulocytes Absolute, Au* 11/17/2023 0.03  0.00 - 0.70 x10*3/uL Final    Lymphocytes Absolute 11/17/2023 2.81  1.20 - 4.80 x10*3/uL Final    Monocytes Absolute 11/17/2023 0.61  0.10 - 1.00 x10*3/uL Final    Eosinophils Absolute 11/17/2023 0.20  0.00 - 0.70 x10*3/uL Final    Basophils Absolute 11/17/2023 0.13 (H)  0.00 - 0.10 x10*3/uL Final     No current outpatient medications on file prior to visit.     No current facility-administered medications on file prior to visit.     No images are attached to the encounter.            Assessment/Plan   Diagnoses and all orders for this visit:  Healthcare maintenance  Moderate mixed hyperlipidemia not requiring  statin therapy    1. Patient's blood work discussed at this office visit  2. Patient's LDL goal less than 130 current , start TYPE II diet, consider CT calcium score  3. Patient's triglyceride goal less than 150 current triglycerides 165 continue on type IV diet continue exercise  4. Colonoscopy @ 45  5.  Lengthy discussion with patient about diet and exercise he is increasing how much he is lifting and watching his diet more closely  6. Patient to call if questions or concerns

## 2023-11-18 ENCOUNTER — OFFICE VISIT (OUTPATIENT)
Dept: PRIMARY CARE | Facility: CLINIC | Age: 28
End: 2023-11-18
Payer: COMMERCIAL

## 2023-11-18 VITALS
HEIGHT: 74 IN | HEART RATE: 80 BPM | SYSTOLIC BLOOD PRESSURE: 128 MMHG | DIASTOLIC BLOOD PRESSURE: 78 MMHG | BODY MASS INDEX: 33.6 KG/M2 | WEIGHT: 261.8 LBS

## 2023-11-18 DIAGNOSIS — Z00.00 HEALTHCARE MAINTENANCE: Primary | ICD-10-CM

## 2023-11-18 DIAGNOSIS — E78.2 MODERATE MIXED HYPERLIPIDEMIA NOT REQUIRING STATIN THERAPY: ICD-10-CM

## 2023-11-18 LAB
ALBUMIN SERPL BCP-MCNC: 4.7 G/DL (ref 3.4–5)
ALP SERPL-CCNC: 43 U/L (ref 33–120)
ALT SERPL W P-5'-P-CCNC: 47 U/L (ref 10–52)
ANION GAP SERPL CALC-SCNC: 15 MMOL/L (ref 10–20)
AST SERPL W P-5'-P-CCNC: 27 U/L (ref 9–39)
BASOPHILS # BLD AUTO: 0.13 X10*3/UL (ref 0–0.1)
BASOPHILS NFR BLD AUTO: 1.6 %
BILIRUB SERPL-MCNC: 0.7 MG/DL (ref 0–1.2)
BUN SERPL-MCNC: 15 MG/DL (ref 6–23)
CALCIUM SERPL-MCNC: 9.9 MG/DL (ref 8.6–10.6)
CHLORIDE SERPL-SCNC: 100 MMOL/L (ref 98–107)
CHOLEST SERPL-MCNC: 254 MG/DL (ref 0–199)
CHOLESTEROL/HDL RATIO: 5.2
CO2 SERPL-SCNC: 26 MMOL/L (ref 21–32)
CREAT SERPL-MCNC: 0.98 MG/DL (ref 0.5–1.3)
EOSINOPHIL # BLD AUTO: 0.2 X10*3/UL (ref 0–0.7)
EOSINOPHIL NFR BLD AUTO: 2.4 %
ERYTHROCYTE [DISTWIDTH] IN BLOOD BY AUTOMATED COUNT: 12 % (ref 11.5–14.5)
GFR SERPL CREATININE-BSD FRML MDRD: >90 ML/MIN/1.73M*2
GLUCOSE SERPL-MCNC: 78 MG/DL (ref 74–99)
HCT VFR BLD AUTO: 47.3 % (ref 41–52)
HDLC SERPL-MCNC: 48.6 MG/DL
HGB BLD-MCNC: 15.6 G/DL (ref 13.5–17.5)
IMM GRANULOCYTES # BLD AUTO: 0.03 X10*3/UL (ref 0–0.7)
IMM GRANULOCYTES NFR BLD AUTO: 0.4 % (ref 0–0.9)
LDLC SERPL CALC-MCNC: 172 MG/DL
LYMPHOCYTES # BLD AUTO: 2.81 X10*3/UL (ref 1.2–4.8)
LYMPHOCYTES NFR BLD AUTO: 33.6 %
MCH RBC QN AUTO: 29.2 PG (ref 26–34)
MCHC RBC AUTO-ENTMCNC: 33 G/DL (ref 32–36)
MCV RBC AUTO: 89 FL (ref 80–100)
MONOCYTES # BLD AUTO: 0.61 X10*3/UL (ref 0.1–1)
MONOCYTES NFR BLD AUTO: 7.3 %
NEUTROPHILS # BLD AUTO: 4.58 X10*3/UL (ref 1.2–7.7)
NEUTROPHILS NFR BLD AUTO: 54.7 %
NON HDL CHOLESTEROL: 205 MG/DL (ref 0–149)
NRBC BLD-RTO: 0 /100 WBCS (ref 0–0)
PLATELET # BLD AUTO: 213 X10*3/UL (ref 150–450)
POTASSIUM SERPL-SCNC: 4.1 MMOL/L (ref 3.5–5.3)
PROT SERPL-MCNC: 7 G/DL (ref 6.4–8.2)
RBC # BLD AUTO: 5.34 X10*6/UL (ref 4.5–5.9)
SODIUM SERPL-SCNC: 137 MMOL/L (ref 136–145)
TRIGL SERPL-MCNC: 165 MG/DL (ref 0–149)
VLDL: 33 MG/DL (ref 0–40)
WBC # BLD AUTO: 8.4 X10*3/UL (ref 4.4–11.3)

## 2023-11-18 PROCEDURE — 99395 PREV VISIT EST AGE 18-39: CPT | Performed by: FAMILY MEDICINE

## 2023-11-18 PROCEDURE — 1036F TOBACCO NON-USER: CPT | Performed by: FAMILY MEDICINE

## 2024-03-26 DIAGNOSIS — H60.333 ACUTE SWIMMER'S EAR OF BOTH SIDES: Primary | ICD-10-CM

## 2024-03-26 RX ORDER — OFLOXACIN 3 MG/ML
5 SOLUTION AURICULAR (OTIC) 2 TIMES DAILY
Qty: 5 ML | Refills: 0 | Status: SHIPPED | OUTPATIENT
Start: 2024-03-26 | End: 2024-04-02

## 2024-03-26 RX ORDER — AMOXICILLIN AND CLAVULANATE POTASSIUM 875; 125 MG/1; MG/1
875 TABLET, FILM COATED ORAL 2 TIMES DAILY
Qty: 20 TABLET | Refills: 0 | Status: SHIPPED | OUTPATIENT
Start: 2024-03-26 | End: 2024-04-05

## 2024-08-01 ENCOUNTER — LAB (OUTPATIENT)
Dept: LAB | Facility: LAB | Age: 29
End: 2024-08-01
Payer: COMMERCIAL

## 2024-08-01 ENCOUNTER — OFFICE VISIT (OUTPATIENT)
Dept: PRIMARY CARE | Facility: CLINIC | Age: 29
End: 2024-08-01
Payer: COMMERCIAL

## 2024-08-01 VITALS
TEMPERATURE: 97.8 F | WEIGHT: 263.2 LBS | DIASTOLIC BLOOD PRESSURE: 86 MMHG | SYSTOLIC BLOOD PRESSURE: 140 MMHG | HEART RATE: 95 BPM | BODY MASS INDEX: 33.78 KG/M2 | HEIGHT: 74 IN

## 2024-08-01 DIAGNOSIS — R10.9 LEFT FLANK PAIN: ICD-10-CM

## 2024-08-01 DIAGNOSIS — E86.0 DEHYDRATION: ICD-10-CM

## 2024-08-01 DIAGNOSIS — E86.0 DEHYDRATION: Primary | ICD-10-CM

## 2024-08-01 DIAGNOSIS — M62.838 CERVICAL PARASPINAL MUSCLE SPASM: ICD-10-CM

## 2024-08-01 LAB
POC APPEARANCE, URINE: CLEAR
POC BILIRUBIN, URINE: NEGATIVE
POC BLOOD, URINE: ABNORMAL
POC COLOR, URINE: ABNORMAL
POC GLUCOSE, URINE: NEGATIVE MG/DL
POC KETONES, URINE: NEGATIVE MG/DL
POC LEUKOCYTES, URINE: NEGATIVE
POC NITRITE,URINE: NEGATIVE
POC PH, URINE: 6 PH
POC PROTEIN, URINE: NEGATIVE MG/DL
POC SPECIFIC GRAVITY, URINE: 1.02
POC UROBILINOGEN, URINE: 1 EU/DL

## 2024-08-01 PROCEDURE — 81003 URINALYSIS AUTO W/O SCOPE: CPT | Performed by: STUDENT IN AN ORGANIZED HEALTH CARE EDUCATION/TRAINING PROGRAM

## 2024-08-01 PROCEDURE — 82550 ASSAY OF CK (CPK): CPT

## 2024-08-01 PROCEDURE — 36415 COLL VENOUS BLD VENIPUNCTURE: CPT

## 2024-08-01 PROCEDURE — 3008F BODY MASS INDEX DOCD: CPT | Performed by: STUDENT IN AN ORGANIZED HEALTH CARE EDUCATION/TRAINING PROGRAM

## 2024-08-01 PROCEDURE — 83735 ASSAY OF MAGNESIUM: CPT

## 2024-08-01 PROCEDURE — 85025 COMPLETE CBC W/AUTO DIFF WBC: CPT

## 2024-08-01 PROCEDURE — 99213 OFFICE O/P EST LOW 20 MIN: CPT | Performed by: STUDENT IN AN ORGANIZED HEALTH CARE EDUCATION/TRAINING PROGRAM

## 2024-08-01 PROCEDURE — 80053 COMPREHEN METABOLIC PANEL: CPT

## 2024-08-01 RX ORDER — CYCLOBENZAPRINE HCL 5 MG
5 TABLET ORAL NIGHTLY PRN
Qty: 10 TABLET | Refills: 0 | Status: SHIPPED | OUTPATIENT
Start: 2024-08-01

## 2024-08-01 ASSESSMENT — ENCOUNTER SYMPTOMS
DIARRHEA: 0
SHORTNESS OF BREATH: 0
ARTHRALGIAS: 0
FEVER: 0
BACK PAIN: 0
FATIGUE: 0
HEMATURIA: 0
LIGHT-HEADEDNESS: 0
CHILLS: 1
COUGH: 0
CONSTIPATION: 0
NECK PAIN: 1
FLANK PAIN: 1
VOMITING: 0
HEADACHES: 1
MYALGIAS: 0
NAUSEA: 0
DIZZINESS: 0
ABDOMINAL PAIN: 0

## 2024-08-01 ASSESSMENT — PATIENT HEALTH QUESTIONNAIRE - PHQ9
2. FEELING DOWN, DEPRESSED OR HOPELESS: NOT AT ALL
SUM OF ALL RESPONSES TO PHQ9 QUESTIONS 1 AND 2: 0
1. LITTLE INTEREST OR PLEASURE IN DOING THINGS: NOT AT ALL

## 2024-08-01 NOTE — PATIENT INSTRUCTIONS
We can go right downstairs and get the blood work done.  We will contact you back with results.  These will also be available on [facility] if they are not back before the weekend.    I went and sent in a medication called cyclobenzaprine.  This is a muscle relaxer.  He can take this at night prior to bed.  This can help to relax the muscles and will also make you little bit drowsy.  Please make sure to be careful during the night if he have to wake up to avoid any sort of falling.    You can continue regular use of the Advil and Tylenol.  You can alternate every 6-8 hours of the Advil as well as every 8 hours with the Tylenol.  You can also break this in between if you need additional control.    Please continue with regular hydration.  I do prefer Pedialyte or liquid IV as this will contain enough salts to help your body hold onto additional water.    If you do have any worsening of symptoms especially in the flank pain or the neck that does not seem to be responding, please give our office back a call.    Please call with any additional questions or concerns.    Thank you

## 2024-08-01 NOTE — PROGRESS NOTES
"Subjective   Patient ID: Gonzalo Devi is a 28 y.o. male who presents for Flank Pain.    Flank Pain  Associated symptoms include headaches. Pertinent negatives include no abdominal pain, chest pain or fever.        Yesterday he thinks that he got dehydrated.  He has been outside more often coaching football.  They did start 2 days so he has been more.  He started getting some cramping in the left flank which then eventually went up to the left side of his neck as well.  He got some started drinking more Pedialyte and hydrating.  He states that his pee was more yellow initially and has started to clear up a bit.  He has been taking some Advil about every 6 hours.  If not this tends to cramp back and tighten up again.  Now sometimes bending forward this seems more in his stomach.  He has never had cramping last this long.  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  No other illness symptoms but he is not sure if he might be starting off with something as well.      Review of Systems   Constitutional:  Positive for chills. Negative for fatigue and fever.   HENT:  Negative for congestion and postnasal drip.    Respiratory:  Negative for cough and shortness of breath.    Cardiovascular:  Negative for chest pain.   Gastrointestinal:  Negative for abdominal pain, constipation, diarrhea, nausea and vomiting.   Genitourinary:  Positive for flank pain (left). Negative for genital sores and hematuria.   Musculoskeletal:  Positive for neck pain (left cramping). Negative for arthralgias, back pain and myalgias.   Neurological:  Positive for headaches. Negative for dizziness and light-headedness.       Objective   /86   Pulse 95   Temp 36.6 °C (97.8 °F)   Ht 1.88 m (6' 2\")   Wt 119 kg (263 lb 3.2 oz)   BMI 33.79 kg/m²     Physical Exam  Vitals and nursing note reviewed.   Constitutional:       General: He is not in acute distress.     Appearance: Normal appearance. He is normal weight. He is not " ill-appearing or toxic-appearing.   HENT:      Head: Normocephalic and atraumatic.   Cardiovascular:      Rate and Rhythm: Normal rate and regular rhythm.      Heart sounds: Normal heart sounds.   Pulmonary:      Effort: Pulmonary effort is normal.      Breath sounds: Normal breath sounds.   Abdominal:      General: Abdomen is flat. Bowel sounds are normal. There is no distension.      Palpations: Abdomen is soft.      Tenderness: There is no abdominal tenderness. There is no right CVA tenderness, left CVA tenderness, guarding or rebound.   Musculoskeletal:         General: Tenderness (Mild discomfort to palpation over the left flank) present. No swelling, deformity or signs of injury. Normal range of motion.      Cervical back: Normal range of motion and neck supple. Tenderness (Mild tenderness to palpation in the left paraspinal musculature) present. No rigidity.   Neurological:      Mental Status: He is alert.         Assessment/Plan   Problem List Items Addressed This Visit    None  Visit Diagnoses         Codes    Dehydration    -  Primary E86.0    Relevant Orders    Comprehensive Metabolic Panel (Completed)    CBC and Auto Differential (Completed)    Magnesium (Completed)    CK (Completed)    Left flank pain     R10.9    Relevant Medications    cyclobenzaprine (Flexeril) 5 mg tablet    Other Relevant Orders    Comprehensive Metabolic Panel (Completed)    CBC and Auto Differential (Completed)    Magnesium (Completed)    CK (Completed)    POCT UA Automated manually resulted (Completed)    Cervical paraspinal muscle spasm     M62.838    Relevant Medications    cyclobenzaprine (Flexeril) 5 mg tablet          History and physical examination as above.  Patient with likely episode of dehydration leading to increased symptoms without recovery.  Decided to do workup since this has been going longer than the patient has normally had at the past.  Urinalysis performed in the office.  Sent in a muscle relaxer for his  symptoms.  Also ordered blood work including CBC and CMP, magnesium level and CK.  Will contact patient with results.  Patient will continue using over-the-counter medications to help with pain.  Discussed hydration formulas.  Patient will call the office back if he is having continued symptoms without adequate relief.

## 2024-08-02 LAB
ALBUMIN SERPL BCP-MCNC: 4.6 G/DL (ref 3.4–5)
ALP SERPL-CCNC: 54 U/L (ref 33–120)
ALT SERPL W P-5'-P-CCNC: 42 U/L (ref 10–52)
ANION GAP SERPL CALC-SCNC: 16 MMOL/L (ref 10–20)
AST SERPL W P-5'-P-CCNC: 26 U/L (ref 9–39)
BASOPHILS # BLD AUTO: 0.09 X10*3/UL (ref 0–0.1)
BASOPHILS NFR BLD AUTO: 1.2 %
BILIRUB SERPL-MCNC: 1.1 MG/DL (ref 0–1.2)
BUN SERPL-MCNC: 15 MG/DL (ref 6–23)
CALCIUM SERPL-MCNC: 9.9 MG/DL (ref 8.6–10.6)
CHLORIDE SERPL-SCNC: 101 MMOL/L (ref 98–107)
CK SERPL-CCNC: 160 U/L (ref 0–325)
CO2 SERPL-SCNC: 26 MMOL/L (ref 21–32)
CREAT SERPL-MCNC: 1.09 MG/DL (ref 0.5–1.3)
EGFRCR SERPLBLD CKD-EPI 2021: >90 ML/MIN/1.73M*2
EOSINOPHIL # BLD AUTO: 0.07 X10*3/UL (ref 0–0.7)
EOSINOPHIL NFR BLD AUTO: 0.9 %
ERYTHROCYTE [DISTWIDTH] IN BLOOD BY AUTOMATED COUNT: 12.1 % (ref 11.5–14.5)
GLUCOSE SERPL-MCNC: 84 MG/DL (ref 74–99)
HCT VFR BLD AUTO: 46.4 % (ref 41–52)
HGB BLD-MCNC: 15.4 G/DL (ref 13.5–17.5)
IMM GRANULOCYTES # BLD AUTO: 0.02 X10*3/UL (ref 0–0.7)
IMM GRANULOCYTES NFR BLD AUTO: 0.3 % (ref 0–0.9)
LYMPHOCYTES # BLD AUTO: 1.05 X10*3/UL (ref 1.2–4.8)
LYMPHOCYTES NFR BLD AUTO: 14.1 %
MAGNESIUM SERPL-MCNC: 2.19 MG/DL (ref 1.6–2.4)
MCH RBC QN AUTO: 29.7 PG (ref 26–34)
MCHC RBC AUTO-ENTMCNC: 33.2 G/DL (ref 32–36)
MCV RBC AUTO: 89 FL (ref 80–100)
MONOCYTES # BLD AUTO: 0.82 X10*3/UL (ref 0.1–1)
MONOCYTES NFR BLD AUTO: 11 %
NEUTROPHILS # BLD AUTO: 5.39 X10*3/UL (ref 1.2–7.7)
NEUTROPHILS NFR BLD AUTO: 72.5 %
NRBC BLD-RTO: 0 /100 WBCS (ref 0–0)
PLATELET # BLD AUTO: 189 X10*3/UL (ref 150–450)
POTASSIUM SERPL-SCNC: 4.2 MMOL/L (ref 3.5–5.3)
PROT SERPL-MCNC: 7 G/DL (ref 6.4–8.2)
RBC # BLD AUTO: 5.19 X10*6/UL (ref 4.5–5.9)
SODIUM SERPL-SCNC: 139 MMOL/L (ref 136–145)
WBC # BLD AUTO: 7.4 X10*3/UL (ref 4.4–11.3)

## 2024-11-23 ENCOUNTER — TELEPHONE (OUTPATIENT)
Dept: PRIMARY CARE | Facility: CLINIC | Age: 29
End: 2024-11-23
Payer: COMMERCIAL

## 2024-11-23 DIAGNOSIS — Z13.6 SCREENING FOR CARDIOVASCULAR CONDITION: Primary | ICD-10-CM

## 2024-11-23 NOTE — TELEPHONE ENCOUNTER
Patient scheduled as wellness visit 11/26/24  BW?  Pt stated he will go downstairs on Mon. To get it done

## 2024-11-25 ENCOUNTER — LAB (OUTPATIENT)
Dept: LAB | Facility: LAB | Age: 29
End: 2024-11-25
Payer: COMMERCIAL

## 2024-11-25 DIAGNOSIS — Z13.6 SCREENING FOR CARDIOVASCULAR CONDITION: ICD-10-CM

## 2024-11-25 PROCEDURE — 80053 COMPREHEN METABOLIC PANEL: CPT

## 2024-11-25 PROCEDURE — 84443 ASSAY THYROID STIM HORMONE: CPT

## 2024-11-25 PROCEDURE — 85027 COMPLETE CBC AUTOMATED: CPT

## 2024-11-25 PROCEDURE — 36415 COLL VENOUS BLD VENIPUNCTURE: CPT

## 2024-11-25 PROCEDURE — 80061 LIPID PANEL: CPT

## 2024-11-26 ENCOUNTER — APPOINTMENT (OUTPATIENT)
Dept: PRIMARY CARE | Facility: CLINIC | Age: 29
End: 2024-11-26
Payer: COMMERCIAL

## 2024-11-26 VITALS
SYSTOLIC BLOOD PRESSURE: 132 MMHG | HEIGHT: 74 IN | HEART RATE: 72 BPM | WEIGHT: 267 LBS | OXYGEN SATURATION: 96 % | DIASTOLIC BLOOD PRESSURE: 80 MMHG | BODY MASS INDEX: 34.27 KG/M2 | TEMPERATURE: 97.2 F

## 2024-11-26 DIAGNOSIS — Z00.00 ANNUAL PHYSICAL EXAM: Primary | ICD-10-CM

## 2024-11-26 DIAGNOSIS — E78.2 MODERATE MIXED HYPERLIPIDEMIA NOT REQUIRING STATIN THERAPY: ICD-10-CM

## 2024-11-26 LAB
ALBUMIN SERPL BCP-MCNC: 4.9 G/DL (ref 3.4–5)
ALP SERPL-CCNC: 46 U/L (ref 33–120)
ALT SERPL W P-5'-P-CCNC: 50 U/L (ref 10–52)
ANION GAP SERPL CALC-SCNC: 16 MMOL/L (ref 10–20)
AST SERPL W P-5'-P-CCNC: 24 U/L (ref 9–39)
BILIRUB SERPL-MCNC: 0.7 MG/DL (ref 0–1.2)
BUN SERPL-MCNC: 17 MG/DL (ref 6–23)
CALCIUM SERPL-MCNC: 9.9 MG/DL (ref 8.6–10.6)
CHLORIDE SERPL-SCNC: 102 MMOL/L (ref 98–107)
CHOLEST SERPL-MCNC: 270 MG/DL (ref 0–199)
CHOLESTEROL/HDL RATIO: 5.6
CO2 SERPL-SCNC: 24 MMOL/L (ref 21–32)
CREAT SERPL-MCNC: 0.92 MG/DL (ref 0.5–1.3)
EGFRCR SERPLBLD CKD-EPI 2021: >90 ML/MIN/1.73M*2
ERYTHROCYTE [DISTWIDTH] IN BLOOD BY AUTOMATED COUNT: 11.9 % (ref 11.5–14.5)
GLUCOSE SERPL-MCNC: 91 MG/DL (ref 74–99)
HCT VFR BLD AUTO: 49.9 % (ref 41–52)
HDLC SERPL-MCNC: 47.9 MG/DL
HGB BLD-MCNC: 17.3 G/DL (ref 13.5–17.5)
LDLC SERPL CALC-MCNC: 196 MG/DL
MCH RBC QN AUTO: 29.9 PG (ref 26–34)
MCHC RBC AUTO-ENTMCNC: 34.7 G/DL (ref 32–36)
MCV RBC AUTO: 86 FL (ref 80–100)
NON HDL CHOLESTEROL: 222 MG/DL (ref 0–149)
NRBC BLD-RTO: 0 /100 WBCS (ref 0–0)
PLATELET # BLD AUTO: 220 X10*3/UL (ref 150–450)
POTASSIUM SERPL-SCNC: 4.2 MMOL/L (ref 3.5–5.3)
PROT SERPL-MCNC: 7.3 G/DL (ref 6.4–8.2)
RBC # BLD AUTO: 5.79 X10*6/UL (ref 4.5–5.9)
SODIUM SERPL-SCNC: 138 MMOL/L (ref 136–145)
TRIGL SERPL-MCNC: 131 MG/DL (ref 0–149)
TSH SERPL-ACNC: 1.78 MIU/L (ref 0.44–3.98)
VLDL: 26 MG/DL (ref 0–40)
WBC # BLD AUTO: 8 X10*3/UL (ref 4.4–11.3)

## 2024-11-26 PROCEDURE — 99395 PREV VISIT EST AGE 18-39: CPT | Performed by: NURSE PRACTITIONER

## 2024-11-26 PROCEDURE — 3008F BODY MASS INDEX DOCD: CPT | Performed by: NURSE PRACTITIONER

## 2024-11-26 PROCEDURE — 1036F TOBACCO NON-USER: CPT | Performed by: NURSE PRACTITIONER

## 2024-11-26 ASSESSMENT — ENCOUNTER SYMPTOMS
DEPRESSION: 0
LOSS OF SENSATION IN FEET: 0
OCCASIONAL FEELINGS OF UNSTEADINESS: 0

## 2024-11-26 ASSESSMENT — COLUMBIA-SUICIDE SEVERITY RATING SCALE - C-SSRS
1. IN THE PAST MONTH, HAVE YOU WISHED YOU WERE DEAD OR WISHED YOU COULD GO TO SLEEP AND NOT WAKE UP?: NO
6. HAVE YOU EVER DONE ANYTHING, STARTED TO DO ANYTHING, OR PREPARED TO DO ANYTHING TO END YOUR LIFE?: NO
2. HAVE YOU ACTUALLY HAD ANY THOUGHTS OF KILLING YOURSELF?: NO

## 2024-11-26 ASSESSMENT — PATIENT HEALTH QUESTIONNAIRE - PHQ9
1. LITTLE INTEREST OR PLEASURE IN DOING THINGS: NOT AT ALL
10. IF YOU CHECKED OFF ANY PROBLEMS, HOW DIFFICULT HAVE THESE PROBLEMS MADE IT FOR YOU TO DO YOUR WORK, TAKE CARE OF THINGS AT HOME, OR GET ALONG WITH OTHER PEOPLE: NOT DIFFICULT AT ALL
2. FEELING DOWN, DEPRESSED OR HOPELESS: NOT AT ALL
SUM OF ALL RESPONSES TO PHQ9 QUESTIONS 1 AND 2: 0

## 2024-11-26 NOTE — PROGRESS NOTES
Gonzalo Devi is a 28 y.o. male who is presenting for annual physical exam.     Last  Visit: 10/14/2022    Dental, Vision, Hearing:  Regular dental visits: yes  - Brushes teeth 1-2 times per day  - Flosses? no  Vision problems: no  - Wears glasses or contacts? no  - Last eye exam:  years  Hearing loss: no    Immunization status:  Up to date: no    Lifestyle:  Healthy diet: no  Regular exercise: yes  - Exercise frequency: 3 times per week  - Type of exercise: running and lifting   Alcohol: yes, 2-3 drinks per week  Tobacco: no  Drugs: no    Reproductive Health:  Sexually active: yes, 1 female partner (wife)  Contraception: wife - contraception  Erectile dysfunction: none    Colorectal Cancer Screening:  Last colonoscopy or Cologuard:  n/a  Metabolic Screening:  Lipid profile: 1/25/2024  Glucose screen: 1/25/2024    Review of Systems  Gen: denies fever, chills, weight loss, fatigue  HEENT: denies sinus pressure, sinus congestion, runny nose, red eyes, itchy eyes, vision loss, ear pain, hearing loss, throat pain, trouble swallowing  Neck: denies neck pain, neck swelling or masses  CV: denies chest pain, palpitations, fast heart rate, syncope  Resp: denies shortness of breath, cough, wheezing  GI: denies abdominal pain, nausea, diarrhea, constipation, hematochezia, melena  : denies dysuria, hematuria, penile discharge, frequency  Endo: denies polydipsia, polyuria, heat/cold intolerance, weight change, hair thinning  Heme: denies easy bruising, easy bleeding  Neuro: denies headache, numbness, tingling, memory loss, changes in vision  MSK: denies joint pain, joint swelling, weakness  Psych: denies suicidal ideation, homicidal ideation, depression, anxiety, mood swings  Skin: denies rashes, abnormal lesions, itching, changes in moles     Previous History  Past Medical History:   Diagnosis Date    Epistaxis 01/18/2014    Epistaxis     No past surgical history on file.  Social History     Tobacco Use    Smoking status:  "Never     Passive exposure: Never    Smokeless tobacco: Never   Vaping Use    Vaping status: Never Used   Substance Use Topics    Alcohol use: Yes    Drug use: Never     No family history on file.  No Known Allergies  Current Outpatient Medications   Medication Instructions    cyclobenzaprine (FLEXERIL) 5 mg, oral, Nightly PRN     Lab Results   Component Value Date    CHOL 270 (H) 11/25/2024    CHOL 254 (H) 11/17/2023    CHOL 189 10/12/2022     Lab Results   Component Value Date    HDL 47.9 11/25/2024    HDL 48.6 11/17/2023    HDL 46.2 10/12/2022     Lab Results   Component Value Date    LDLCALC 196 (H) 11/25/2024    LDLCALC 172 (H) 11/17/2023     Lab Results   Component Value Date    TRIG 131 11/25/2024    TRIG 165 (H) 11/17/2023    TRIG 227 (H) 10/12/2022     No components found for: \"CHOLHDL\"     Lab Results   Component Value Date    TSH 1.78 11/25/2024      Lab Results   Component Value Date    WBC 8.0 11/25/2024    HGB 17.3 11/25/2024    HCT 49.9 11/25/2024    MCV 86 11/25/2024     11/25/2024        Chemistry    Lab Results   Component Value Date/Time     11/25/2024 1132    K 4.2 11/25/2024 1132     11/25/2024 1132    CO2 24 11/25/2024 1132    BUN 17 11/25/2024 1132    CREATININE 0.92 11/25/2024 1132    Lab Results   Component Value Date/Time    CALCIUM 9.9 11/25/2024 1132    ALKPHOS 46 11/25/2024 1132    AST 24 11/25/2024 1132    ALT 50 11/25/2024 1132    BILITOT 0.7 11/25/2024 1132         Physical Exam:  General: Alert and oriented, in no acute distress. Appears stated age, well-nourished, and well hydrated  HEENT:  - Head: Normocephalic and atraumatic   - Eyes: EOMI, PERRLA  - ENT: Hearing grossly intact. Mucus membranes pink and moist without lesions. Tonsils present without swelling or exudates. Good dentition. TMs gray  Neck: Supple. No stiffness. No thyromegaly or thyroid nodules  Heart: RRR. No murmurs, clicks, or rubs  Lungs: Unlabored breathing. CTAB with no crackles, wheezes, or " rhonchi  Abdomen: Normal BS in all 4 quadrants. Soft, non-tender, non-distended, with no masses  Extremities: Warm and well perfused. No edema. Normal peripheral pulses  Musculoskeletal: ROM intact. Strength 5/5 in BUE and BLE. No joint swelling. Normal gait and station  Neurological: Alert and oriented. No gross neurological deficits. Normal sensation. No weakness. DTRs +2/4   Psychological: Appropriate mood and affect  Skin: No rash, abnormal lesions, cyanosis, or erythema      Assessment and Plan     1. Annual Physical  - Declined COVID and flu vaccines  - Reviewed labs from 11/25/2024; HLD - discussed type II diet and regular exercise  - Work form completed and returned to patient    RTC in 1 year for physical or sooner HERMINIO Shanks-CNP  [facility]

## 2024-12-23 DIAGNOSIS — H60.90 OTITIS EXTERNA, UNSPECIFIED CHRONICITY, UNSPECIFIED LATERALITY, UNSPECIFIED TYPE: Primary | ICD-10-CM

## 2024-12-23 RX ORDER — AMOXICILLIN AND CLAVULANATE POTASSIUM 875; 125 MG/1; MG/1
875 TABLET, FILM COATED ORAL 2 TIMES DAILY
Qty: 20 TABLET | Refills: 0 | Status: SHIPPED | OUTPATIENT
Start: 2024-12-23 | End: 2025-01-02

## 2024-12-23 RX ORDER — OFLOXACIN 3 MG/ML
5 SOLUTION AURICULAR (OTIC) 2 TIMES DAILY
Qty: 0.35 ML | Refills: 0 | Status: SHIPPED | OUTPATIENT
Start: 2024-12-23 | End: 2024-12-30

## 2025-06-12 DIAGNOSIS — J30.1 ALLERGIC RHINITIS DUE TO POLLEN, UNSPECIFIED SEASONALITY: Primary | ICD-10-CM

## 2025-06-12 RX ORDER — METHYLPREDNISOLONE 4 MG/1
TABLET ORAL
Qty: 21 TABLET | Refills: 0 | Status: SHIPPED | OUTPATIENT
Start: 2025-06-12 | End: 2025-06-18

## 2025-06-12 NOTE — PROGRESS NOTES
Terrible allergies.  ,  On Flonase and Claritin still having issues sent in prescription for Medrol Dosepak